# Patient Record
Sex: MALE | Race: WHITE | URBAN - METROPOLITAN AREA
[De-identification: names, ages, dates, MRNs, and addresses within clinical notes are randomized per-mention and may not be internally consistent; named-entity substitution may affect disease eponyms.]

---

## 2018-10-24 ENCOUNTER — TELEPHONE (OUTPATIENT)
Dept: CARDIOLOGY | Facility: CLINIC | Age: 23
End: 2018-10-24

## 2018-11-29 ENCOUNTER — TELEPHONE (OUTPATIENT)
Dept: INTERPRETER SERVICES | Facility: CLINIC | Age: 23
End: 2018-11-29

## 2018-12-03 ENCOUNTER — TELEPHONE (OUTPATIENT)
Dept: CARDIOLOGY | Facility: CLINIC | Age: 23
End: 2018-12-03

## 2019-03-05 DIAGNOSIS — Q25.1 COARCTATION OF AORTA: Primary | ICD-10-CM

## 2019-03-08 ENCOUNTER — OFFICE VISIT (OUTPATIENT)
Dept: CARDIOLOGY | Facility: CLINIC | Age: 24
End: 2019-03-08
Attending: INTERNAL MEDICINE
Payer: COMMERCIAL

## 2019-03-08 ENCOUNTER — RESULTS ONLY (OUTPATIENT)
Dept: CARDIOLOGY | Facility: CLINIC | Age: 24
End: 2019-03-08

## 2019-03-08 ENCOUNTER — ANCILLARY PROCEDURE (OUTPATIENT)
Dept: CARDIOLOGY | Facility: CLINIC | Age: 24
End: 2019-03-08
Payer: COMMERCIAL

## 2019-03-08 VITALS
HEIGHT: 76 IN | SYSTOLIC BLOOD PRESSURE: 143 MMHG | BODY MASS INDEX: 21.75 KG/M2 | DIASTOLIC BLOOD PRESSURE: 79 MMHG | HEART RATE: 57 BPM | OXYGEN SATURATION: 100 % | WEIGHT: 178.6 LBS

## 2019-03-08 DIAGNOSIS — Q25.1 COARCTATION OF AORTA (PREDUCTAL) (POSTDUCTAL): Primary | ICD-10-CM

## 2019-03-08 DIAGNOSIS — Q25.1 COARCTATION OF AORTA: ICD-10-CM

## 2019-03-08 DIAGNOSIS — Q25.1 COARCTATION OF AORTA (PREDUCTAL) (POSTDUCTAL): ICD-10-CM

## 2019-03-08 LAB
ALBUMIN SERPL-MCNC: 4.4 G/DL (ref 3.4–5)
ALP SERPL-CCNC: 72 U/L (ref 40–150)
ALT SERPL W P-5'-P-CCNC: 42 U/L (ref 0–70)
ANION GAP SERPL CALCULATED.3IONS-SCNC: 6 MMOL/L (ref 3–14)
AST SERPL W P-5'-P-CCNC: 26 U/L (ref 0–45)
BASOPHILS # BLD AUTO: 0 10E9/L (ref 0–0.2)
BASOPHILS NFR BLD AUTO: 0.2 %
BILIRUB SERPL-MCNC: 0.6 MG/DL (ref 0.2–1.3)
BUN SERPL-MCNC: 21 MG/DL (ref 7–30)
CALCIUM SERPL-MCNC: 9 MG/DL (ref 8.5–10.1)
CHLORIDE SERPL-SCNC: 103 MMOL/L (ref 94–109)
CO2 SERPL-SCNC: 29 MMOL/L (ref 20–32)
CREAT SERPL-MCNC: 0.75 MG/DL (ref 0.66–1.25)
DIFFERENTIAL METHOD BLD: NORMAL
EOSINOPHIL # BLD AUTO: 0.2 10E9/L (ref 0–0.7)
EOSINOPHIL NFR BLD AUTO: 2.7 %
ERYTHROCYTE [DISTWIDTH] IN BLOOD BY AUTOMATED COUNT: 11.8 % (ref 10–15)
GFR SERPL CREATININE-BSD FRML MDRD: >90 ML/MIN/{1.73_M2}
GLUCOSE SERPL-MCNC: 94 MG/DL (ref 70–99)
HCT VFR BLD AUTO: 49.7 % (ref 40–53)
HGB BLD-MCNC: 16.2 G/DL (ref 13.3–17.7)
IMM GRANULOCYTES # BLD: 0 10E9/L (ref 0–0.4)
IMM GRANULOCYTES NFR BLD: 0.2 %
INTERPRETATION ECG - MUSE: NORMAL
LYMPHOCYTES # BLD AUTO: 2.2 10E9/L (ref 0.8–5.3)
LYMPHOCYTES NFR BLD AUTO: 38.1 %
MCH RBC QN AUTO: 28.9 PG (ref 26.5–33)
MCHC RBC AUTO-ENTMCNC: 32.6 G/DL (ref 31.5–36.5)
MCV RBC AUTO: 89 FL (ref 78–100)
MONOCYTES # BLD AUTO: 0.5 10E9/L (ref 0–1.3)
MONOCYTES NFR BLD AUTO: 8.9 %
NEUTROPHILS # BLD AUTO: 2.9 10E9/L (ref 1.6–8.3)
NEUTROPHILS NFR BLD AUTO: 49.9 %
NRBC # BLD AUTO: 0 10*3/UL
NRBC BLD AUTO-RTO: 0 /100
PLATELET # BLD AUTO: 170 10E9/L (ref 150–450)
POTASSIUM SERPL-SCNC: 4 MMOL/L (ref 3.4–5.3)
PROT SERPL-MCNC: 8.1 G/DL (ref 6.8–8.8)
RBC # BLD AUTO: 5.6 10E12/L (ref 4.4–5.9)
SODIUM SERPL-SCNC: 138 MMOL/L (ref 133–144)
WBC # BLD AUTO: 5.8 10E9/L (ref 4–11)

## 2019-03-08 PROCEDURE — 80053 COMPREHEN METABOLIC PANEL: CPT | Performed by: INTERNAL MEDICINE

## 2019-03-08 PROCEDURE — G0463 HOSPITAL OUTPT CLINIC VISIT: HCPCS

## 2019-03-08 PROCEDURE — 85025 COMPLETE CBC W/AUTO DIFF WBC: CPT | Performed by: INTERNAL MEDICINE

## 2019-03-08 PROCEDURE — 99214 OFFICE O/P EST MOD 30 MIN: CPT | Mod: ZP | Performed by: INTERNAL MEDICINE

## 2019-03-08 PROCEDURE — 93010 ELECTROCARDIOGRAM REPORT: CPT | Mod: ZP | Performed by: INTERNAL MEDICINE

## 2019-03-08 PROCEDURE — 36415 COLL VENOUS BLD VENIPUNCTURE: CPT | Performed by: INTERNAL MEDICINE

## 2019-03-08 ASSESSMENT — MIFFLIN-ST. JEOR: SCORE: 1911.62

## 2019-03-08 ASSESSMENT — PAIN SCALES - GENERAL: PAINLEVEL: NO PAIN (0)

## 2019-03-08 NOTE — LETTER
3/8/2019      RE: Amandeep Butler  [address]  [place] [address]       Dear Colleague,    Thank you for the opportunity to participate in the care of your patient, Amandeep Butler, at the [facility]. Please see a copy of my visit note below.    Adult congenital cardiology clinic visit:    HPI: 24 year old male with h/o coarctation of aorta s/psubclavian flap repair during his infancy by Dr. Blaine Mari presents for ongoing evaluation and management. Pt reports that he has been feeling well.  He is now a louisa in college.  He continue to run 2-4 miles 3-4 times per week.  He denies any chest pain or pressure, sob/fernandez, orthopnea, pnd, palpitations, syncope/presyncope or sissy.    Past Medical History:   Diagnosis Date     Coarctation of aorta     Subclavian flap repair < 3 year of age     Family History   Problem Relation Age of Onset     Bone Cancer Father      Social History     Socioeconomic History     Marital status: Single     Spouse name: Not on file     Number of children: Not on file     Years of education: Not on file     Highest education level: Not on file   Occupational History     Not on file   Social Needs     Financial resource strain: Not on file     Food insecurity:     Worry: Not on file     Inability: Not on file     Transportation needs:     Medical: Not on file     Non-medical: Not on file   Tobacco Use     Smoking status: Never Smoker   Substance and Sexual Activity     Alcohol use: Not on file     Drug use: Not on file     Sexual activity: Not on file   Lifestyle     Physical activity:     Days per week: Not on file     Minutes per session: Not on file     Stress: Not on file   Relationships     Social connections:     Talks on phone: Not on file     Gets together: Not on file     Attends Faith service: Not on file     Active member of club or organization: Not on file     Attends meetings of clubs or organizations: Not on file      "Relationship status: Not on file     Intimate partner violence:     Fear of current or ex partner: Not on file     Emotionally abused: Not on file     Physically abused: Not on file     Forced sexual activity: Not on file   Other Topics Concern     Not on file   Social History Narrative    Lives at home, 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     No current facility-administered medications for this visit.      ROS:   Constitutional: No fever, chills, or sweats.   ENT: No visual disturbance, ear ache, epistaxis, sore throat.   Allergies/Immunologic: Negative.   Respiratory: No cough, hemoptysis.   Cardiovascular: As per HPI.   GI: No nausea, vomiting, hematemesis, melena, or hematochezia.   : No urinary frequency, dysuria, or hematuria.   Integument: Negative.   Psychiatric: Negative.   Neuro: Negative.   Endocrinology: Negative.   Musculoskeletal: Negative.    EXAM:    /79 (BP Location: Right leg, Patient Position: Supine, Cuff Size: Adult Regular)   Pulse 57   Ht 1.93 m (6' 4\")   Wt 81 kg (178 lb 9.6 oz)   SpO2 100%   BMI 21.74 kg/m      General: appears comfortable, alert.  Head: normocephalic, atraumatic  Eyes: anicteric sclera, EOMI  Orophyarynx: moist mucosa, no lesions  Heart: regular, rate and rhythm, S1/S2, no murmur appreciated no gallop or rub  Lungs: clear, no rales or wheezing  Abdomen: soft, non-tender, bowel sounds present, no hepatomegaly  Extremities: no clubbing, cyanosis or edema, no left and right arm visual size discrepancies  Neurological: Normal mentation    Labs:  Orders Only on 03/08/2019   Component Date Value Ref Range Status     Sodium 03/08/2019 138  133 - 144 mmol/L Final     Potassium 03/08/2019 4.0  3.4 - 5.3 mmol/L Final     Chloride 03/08/2019 103  94 - 109 mmol/L Final     Carbon Dioxide 03/08/2019 29  20 - 32 mmol/L Final     Anion Gap 03/08/2019 6  3 - 14 mmol/L Final     Glucose 03/08/2019 94  " 70 - 99 mg/dL Final     Urea Nitrogen 03/08/2019 21  7 - 30 mg/dL Final     Creatinine 03/08/2019 0.75  0.66 - 1.25 mg/dL Final     GFR Estimate 03/08/2019 >90  >60 mL/min/[1.73_m2] Final    Comment: Non  GFR Calc  Starting 12/18/2018, serum creatinine based estimated GFR (eGFR) will be   calculated using the Chronic Kidney Disease Epidemiology Collaboration   (CKD-EPI) equation.       GFR Estimate If Black 03/08/2019 >90  >60 mL/min/[1.73_m2] Final    Comment:  GFR Calc  Starting 12/18/2018, serum creatinine based estimated GFR (eGFR) will be   calculated using the Chronic Kidney Disease Epidemiology Collaboration   (CKD-EPI) equation.       Calcium 03/08/2019 9.0  8.5 - 10.1 mg/dL Final     Bilirubin Total 03/08/2019 0.6  0.2 - 1.3 mg/dL Final     Albumin 03/08/2019 4.4  3.4 - 5.0 g/dL Final     Protein Total 03/08/2019 8.1  6.8 - 8.8 g/dL Final     Alkaline Phosphatase 03/08/2019 72  40 - 150 U/L Final     ALT 03/08/2019 42  0 - 70 U/L Final     AST 03/08/2019 26  0 - 45 U/L Final     WBC 03/08/2019 5.8  4.0 - 11.0 10e9/L Final     RBC Count 03/08/2019 5.60  4.4 - 5.9 10e12/L Final     Hemoglobin 03/08/2019 16.2  13.3 - 17.7 g/dL Final     Hematocrit 03/08/2019 49.7  40.0 - 53.0 % Final     MCV 03/08/2019 89  78 - 100 fl Final     MCH 03/08/2019 28.9  26.5 - 33.0 pg Final     MCHC 03/08/2019 32.6  31.5 - 36.5 g/dL Final     RDW 03/08/2019 11.8  10.0 - 15.0 % Final     Platelet Count 03/08/2019 170  150 - 450 10e9/L Final     Diff Method 03/08/2019 Automated Method   Final     % Neutrophils 03/08/2019 49.9  % Final     % Lymphocytes 03/08/2019 38.1  % Final     % Monocytes 03/08/2019 8.9  % Final     % Eosinophils 03/08/2019 2.7  % Final     % Basophils 03/08/2019 0.2  % Final     % Immature Granulocytes 03/08/2019 0.2  % Final     Nucleated RBCs 03/08/2019 0  0 /100 Final     Absolute Neutrophil 03/08/2019 2.9  1.6 - 8.3 10e9/L Final     Absolute Lymphocytes 03/08/2019 2.2  0.8 -  5.3 10e9/L Final     Absolute Monocytes 03/08/2019 0.5  0.0 - 1.3 10e9/L Final     Absolute Eosinophils 03/08/2019 0.2  0.0 - 0.7 10e9/L Final     Absolute Basophils 03/08/2019 0.0  0.0 - 0.2 10e9/L Final     Abs Immature Granulocytes 03/08/2019 0.0  0 - 0.4 10e9/L Final     Absolute Nucleated RBC 03/08/2019 0.0   Final       MRA of the head without contrast: 11/18/2016.  FINDINGS: The bulk of the flow to the posterior cerebral arteries is through the internal carotid arteries, with well-developed posterior indicators bilaterally. Mildly hypoplastic P1 segments bilaterally.  The anterior communicating artery is patent. No aneurysm or focal stenosis.                                                                 IMPRESSION: Normal MRA of the head.    MRA CHEST W/WO 11/18/2016  1. The aortic root is normal in size. The ascending aorta, arch, and  descending aorta are normal in diameter. The narrowest portion of the  area of prior coarctation repair measures 1.8 cm x 1.8 cm. There is no  dissection seen.  2. The aortic arch is left sided. There are two great vessels arising  from the aortic arch; the left subclavian artery is not visualized,  related to the coarctation repair.  Bi-orthogonal luminal aortic dimensions are:  1.  Aortic root at the sinuses of Valsalva = 2.8 cm x 2.7 cm  2.  Sinotubular junction = 2.7 cm x 2.5 cm  3.  Mid-ascending aorta (midpoint in length between Nos. 2 and 4) = 2.9 cm x 2.8 cm  4.  Proximal aortic arch (aorta at the origin of the innominate artery) = 2.4 cm x 2.3 cm  5.  Mid-aortic arch (between left common carotid and subclavian arteries) = 2.3 cm x 2.1 cm  6.  Proximal descending thoracic aorta (area of coarctation repair) = 2.0 cm x 1.9 cm  7.  Narrowest portion within the area of coarctation repair = 1.8 cm x 1.8 cm  8.  Mid-descending aorta (midpoint in length between Nos. 6 and 8) = 2.1 cm x 2.0 cm  9.  Aorta at diaphragm (2 cm above the celiac axis origin) = 1.7 cm x 1.6 cm  10.   Abdominal aorta at the celiac axis origin = 1.7 cm x 1.4 cm                                                         IMPRESSION:  Repaired aortic coarctation with the narrowest portion measuring 1.8cm, which is normal post repair.    Today's echo:   Post surgical repair of coarctation of the aorta. The calculated biplane left  ventricular ejection fraction is 73%. There is normal flow across the aortic  valve. There is unobstructed antegrade flow in the ascending, transverse arch,  descending thoracic and abdominal aorta. The chordae of the anterior leaflet  of the mitral valve are redundant. Mild (1+) mitral valve insufficiency.  Trivial tricuspid valve insufficiency.      Assessment and Plan: 24 year old male with h/o coarctation of aorta s/psubclavian flap repair during his infancy by Dr. Blaine Mari presents for ongoing evaluation and management  1.  h/o coarctation of aorta s/psubclavian flap repair:  Pt continues to do well.  He is euvolemic today by history and exam.  Echo confirms stable findings.  BP well controlled.  Encouraged patient to continue regular aerobic exercise aiming for at least 150 minutes of moderate physical activity or 75 minutes of vigorous physical activity - or an equal combination of both - each week. and follow low-salt, heart healthy diet.   Reminded patient of need to maintain good oral hygiene and continue regular dental care but by recent AHA/ACC guidelines does not require SBE prophylaxis.  Also discussed that the mother of his children should undergo a fetal echocardiogram.    Follow-up: 4 year follow up with an echocardiogram. Will be happy to see sooner if questions/concerns arise.    Julissa Peterson MD

## 2019-03-08 NOTE — PATIENT INSTRUCTIONS
"You were seen today in the Adult Congenital and Cardiovascular Genetics Clinic at the [facility].    Cardiology Providers you saw during your visit:  Dr. Julissa Peterson     Diagnosis:  Coarctation of the Aorta    Results:  The results of your testing was discussed with you today    Recommendations:    1.  Continue to eat a heart healthy, low salt diet.  2.  Continue to get 20-30 minutes of aerobic activity, 4-5 days per week.  Examples of aerobic activity include walking, running, swimming, cycling, etc.  3.  Continue to observe good oral hygiene, with regular dental visits.  4.  If you decide to have children - the mother of your child should have an echo at 18-22 weeks of gestation.      Vitals:    03/08/19 1029 03/08/19 1046 03/08/19 1048 03/08/19 1049   BP: 123/74 99/67 140/80 143/79   BP Location: Right arm Left arm Left leg Right leg   Patient Position: Supine Supine Supine Supine   Cuff Size: Adult Regular Adult Regular Adult Regular Adult Regular   Pulse: 57      SpO2: 100%      Weight: 81 kg (178 lb 9.6 oz)      Height: 1.93 m (6' 4\")        Exercise restrictions:   Yes__X__  No____         If yes, list restrictions:  Must be allowed to rest if fatigued or SOB      Work restrictions:  Yes____  No_X___         If yes, list restrictions:    FASTING CHOLESTEROL was checked in the last 5 years YES_X__  NO___ 2016  Continue to eat a heart healthy, low salt diet.         ____ Fasting lipid panel order today         ____ No changes in medications          ____ I recommend the following changes in your cholesterol medications.:          ____ Please follow up for cholesterol screening at your primary care physician      Follow-up:  Follow up with Dr. Peterson in 4 years with an echo, EKG and labs (you can be seen sooner if needed)    If you have questions or concerns please contact us at:    Becky Munoz RN, BSN   Hebert Marroquin (Scheduling)  Nurse Coordinator     Clinic   Adult " Congenital and CV Genetics Adult Congenital and CV Genetic  [facility]  (P)[phone]    (P) [phone]  [email or web address] (F)[phone]        For after hours urgent needs, call [phone] and ask to speak to the Adult Congenital Physician on call.  Mention Job Code 0401.    For emergencies call 911.    [facility]  [facility]  Mail Code [id number]  [address] [id number]

## 2019-03-08 NOTE — NURSING NOTE
Vitals were taken and medications were reconciled.    EKG, bi-lateral leg and arm b/p taken.    Kurtis Issa    10:59 AM

## 2019-03-11 LAB — INTERPRETATION ECG - MUSE: NORMAL

## 2019-04-08 NOTE — PROGRESS NOTES
Adult congenital cardiology clinic visit:    HPI: 24 year old male with h/o coarctation of aorta s/psubclavian flap repair during his infancy by Dr. Blaine Mari presents for ongoing evaluation and management. Pt reports that he has been feeling well.  He is now a louisa in college.  He continue to run 2-4 miles 3-4 times per week.  He denies any chest pain or pressure, sob/fernandez, orthopnea, pnd, palpitations, syncope/presyncope or sissy.    Past Medical History:   Diagnosis Date     Coarctation of aorta     Subclavian flap repair < 3 year of age     Family History   Problem Relation Age of Onset     Bone Cancer Father      Social History     Socioeconomic History     Marital status: Single     Spouse name: Not on file     Number of children: Not on file     Years of education: Not on file     Highest education level: Not on file   Occupational History     Not on file   Social Needs     Financial resource strain: Not on file     Food insecurity:     Worry: Not on file     Inability: Not on file     Transportation needs:     Medical: Not on file     Non-medical: Not on file   Tobacco Use     Smoking status: Never Smoker   Substance and Sexual Activity     Alcohol use: Not on file     Drug use: Not on file     Sexual activity: Not on file   Lifestyle     Physical activity:     Days per week: Not on file     Minutes per session: Not on file     Stress: Not on file   Relationships     Social connections:     Talks on phone: Not on file     Gets together: Not on file     Attends Mandaen service: Not on file     Active member of club or organization: Not on file     Attends meetings of clubs or organizations: Not on file     Relationship status: Not on file     Intimate partner violence:     Fear of current or ex partner: Not on file     Emotionally abused: Not on file     Physically abused: Not on file     Forced sexual activity: Not on file   Other Topics Concern     Not on file   Social History Narrative    Lives at  "home, 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     No current facility-administered medications for this visit.      ROS:   Constitutional: No fever, chills, or sweats.   ENT: No visual disturbance, ear ache, epistaxis, sore throat.   Allergies/Immunologic: Negative.   Respiratory: No cough, hemoptysis.   Cardiovascular: As per HPI.   GI: No nausea, vomiting, hematemesis, melena, or hematochezia.   : No urinary frequency, dysuria, or hematuria.   Integument: Negative.   Psychiatric: Negative.   Neuro: Negative.   Endocrinology: Negative.   Musculoskeletal: Negative.    EXAM:    /79 (BP Location: Right leg, Patient Position: Supine, Cuff Size: Adult Regular)   Pulse 57   Ht 1.93 m (6' 4\")   Wt 81 kg (178 lb 9.6 oz)   SpO2 100%   BMI 21.74 kg/m     General: appears comfortable, alert.  Head: normocephalic, atraumatic  Eyes: anicteric sclera, EOMI  Orophyarynx: moist mucosa, no lesions  Heart: regular, rate and rhythm, S1/S2, no murmur appreciated no gallop or rub  Lungs: clear, no rales or wheezing  Abdomen: soft, non-tender, bowel sounds present, no hepatomegaly  Extremities: no clubbing, cyanosis or edema, no left and right arm visual size discrepancies  Neurological: Normal mentation    Labs:  Orders Only on 03/08/2019   Component Date Value Ref Range Status     Sodium 03/08/2019 138  133 - 144 mmol/L Final     Potassium 03/08/2019 4.0  3.4 - 5.3 mmol/L Final     Chloride 03/08/2019 103  94 - 109 mmol/L Final     Carbon Dioxide 03/08/2019 29  20 - 32 mmol/L Final     Anion Gap 03/08/2019 6  3 - 14 mmol/L Final     Glucose 03/08/2019 94  70 - 99 mg/dL Final     Urea Nitrogen 03/08/2019 21  7 - 30 mg/dL Final     Creatinine 03/08/2019 0.75  0.66 - 1.25 mg/dL Final     GFR Estimate 03/08/2019 >90  >60 mL/min/[1.73_m2] Final    Comment: Non  GFR Calc  Starting 12/18/2018, serum creatinine based estimated GFR (eGFR) will " be   calculated using the Chronic Kidney Disease Epidemiology Collaboration   (CKD-EPI) equation.       GFR Estimate If Black 03/08/2019 >90  >60 mL/min/[1.73_m2] Final    Comment:  GFR Calc  Starting 12/18/2018, serum creatinine based estimated GFR (eGFR) will be   calculated using the Chronic Kidney Disease Epidemiology Collaboration   (CKD-EPI) equation.       Calcium 03/08/2019 9.0  8.5 - 10.1 mg/dL Final     Bilirubin Total 03/08/2019 0.6  0.2 - 1.3 mg/dL Final     Albumin 03/08/2019 4.4  3.4 - 5.0 g/dL Final     Protein Total 03/08/2019 8.1  6.8 - 8.8 g/dL Final     Alkaline Phosphatase 03/08/2019 72  40 - 150 U/L Final     ALT 03/08/2019 42  0 - 70 U/L Final     AST 03/08/2019 26  0 - 45 U/L Final     WBC 03/08/2019 5.8  4.0 - 11.0 10e9/L Final     RBC Count 03/08/2019 5.60  4.4 - 5.9 10e12/L Final     Hemoglobin 03/08/2019 16.2  13.3 - 17.7 g/dL Final     Hematocrit 03/08/2019 49.7  40.0 - 53.0 % Final     MCV 03/08/2019 89  78 - 100 fl Final     MCH 03/08/2019 28.9  26.5 - 33.0 pg Final     MCHC 03/08/2019 32.6  31.5 - 36.5 g/dL Final     RDW 03/08/2019 11.8  10.0 - 15.0 % Final     Platelet Count 03/08/2019 170  150 - 450 10e9/L Final     Diff Method 03/08/2019 Automated Method   Final     % Neutrophils 03/08/2019 49.9  % Final     % Lymphocytes 03/08/2019 38.1  % Final     % Monocytes 03/08/2019 8.9  % Final     % Eosinophils 03/08/2019 2.7  % Final     % Basophils 03/08/2019 0.2  % Final     % Immature Granulocytes 03/08/2019 0.2  % Final     Nucleated RBCs 03/08/2019 0  0 /100 Final     Absolute Neutrophil 03/08/2019 2.9  1.6 - 8.3 10e9/L Final     Absolute Lymphocytes 03/08/2019 2.2  0.8 - 5.3 10e9/L Final     Absolute Monocytes 03/08/2019 0.5  0.0 - 1.3 10e9/L Final     Absolute Eosinophils 03/08/2019 0.2  0.0 - 0.7 10e9/L Final     Absolute Basophils 03/08/2019 0.0  0.0 - 0.2 10e9/L Final     Abs Immature Granulocytes 03/08/2019 0.0  0 - 0.4 10e9/L Final     Absolute Nucleated RBC  03/08/2019 0.0   Final       MRA of the head without contrast: 11/18/2016.  FINDINGS: The bulk of the flow to the posterior cerebral arteries is through the internal carotid arteries, with well-developed posterior indicators bilaterally. Mildly hypoplastic P1 segments bilaterally.  The anterior communicating artery is patent. No aneurysm or focal stenosis.                                                                 IMPRESSION: Normal MRA of the head.    MRA CHEST W/WO 11/18/2016  1. The aortic root is normal in size. The ascending aorta, arch, and  descending aorta are normal in diameter. The narrowest portion of the  area of prior coarctation repair measures 1.8 cm x 1.8 cm. There is no  dissection seen.  2. The aortic arch is left sided. There are two great vessels arising  from the aortic arch; the left subclavian artery is not visualized,  related to the coarctation repair.  Bi-orthogonal luminal aortic dimensions are:  1.  Aortic root at the sinuses of Valsalva = 2.8 cm x 2.7 cm  2.  Sinotubular junction = 2.7 cm x 2.5 cm  3.  Mid-ascending aorta (midpoint in length between Nos. 2 and 4) = 2.9 cm x 2.8 cm  4.  Proximal aortic arch (aorta at the origin of the innominate artery) = 2.4 cm x 2.3 cm  5.  Mid-aortic arch (between left common carotid and subclavian arteries) = 2.3 cm x 2.1 cm  6.  Proximal descending thoracic aorta (area of coarctation repair) = 2.0 cm x 1.9 cm  7.  Narrowest portion within the area of coarctation repair = 1.8 cm x 1.8 cm  8.  Mid-descending aorta (midpoint in length between Nos. 6 and 8) = 2.1 cm x 2.0 cm  9.  Aorta at diaphragm (2 cm above the celiac axis origin) = 1.7 cm x 1.6 cm  10.  Abdominal aorta at the celiac axis origin = 1.7 cm x 1.4 cm                                                         IMPRESSION:  Repaired aortic coarctation with the narrowest portion measuring 1.8cm, which is normal post repair.    Today's echo:   Post surgical repair of coarctation of the  aorta. The calculated biplane left  ventricular ejection fraction is 73%. There is normal flow across the aortic  valve. There is unobstructed antegrade flow in the ascending, transverse arch,  descending thoracic and abdominal aorta. The chordae of the anterior leaflet  of the mitral valve are redundant. Mild (1+) mitral valve insufficiency.  Trivial tricuspid valve insufficiency.      Assessment and Plan: 24 year old male with h/o coarctation of aorta s/psubclavian flap repair during his infancy by Dr. Blaine Mari presents for ongoing evaluation and management  1.  h/o coarctation of aorta s/psubclavian flap repair:  Pt continues to do well.  He is euvolemic today by history and exam.  Echo confirms stable findings.  BP well controlled.  Encouraged patient to continue regular aerobic exercise aiming for at least 150 minutes of moderate physical activity or 75 minutes of vigorous physical activity - or an equal combination of both - each week. and follow low-salt, heart healthy diet.   Reminded patient of need to maintain good oral hygiene and continue regular dental care but by recent AHA/ACC guidelines does not require SBE prophylaxis.  Also discussed that the mother of his children should undergo a fetal echocardiogram.    Follow-up: 4 year follow up with an echocardiogram. Will be happy to see sooner if questions/concerns arise.    Julissa Peterson MD  Section Head - Advanced Heart Failure, Transplantation and Mechanical Circulatory Support  Director - Adult Congenital and Cardiovascular Genetics Center  Associate Professor of Medicine, [facility]

## 2020-03-10 ENCOUNTER — HEALTH MAINTENANCE LETTER (OUTPATIENT)
Age: 25
End: 2020-03-10

## 2020-12-27 ENCOUNTER — HEALTH MAINTENANCE LETTER (OUTPATIENT)
Age: 25
End: 2020-12-27

## 2021-04-24 ENCOUNTER — HEALTH MAINTENANCE LETTER (OUTPATIENT)
Age: 26
End: 2021-04-24

## 2021-10-09 ENCOUNTER — HEALTH MAINTENANCE LETTER (OUTPATIENT)
Age: 26
End: 2021-10-09

## 2022-05-16 ENCOUNTER — HEALTH MAINTENANCE LETTER (OUTPATIENT)
Age: 27
End: 2022-05-16

## 2022-09-11 ENCOUNTER — HEALTH MAINTENANCE LETTER (OUTPATIENT)
Age: 27
End: 2022-09-11

## 2023-06-03 ENCOUNTER — HEALTH MAINTENANCE LETTER (OUTPATIENT)
Age: 28
End: 2023-06-03